# Patient Record
Sex: MALE | Race: WHITE | NOT HISPANIC OR LATINO | Employment: OTHER | URBAN - METROPOLITAN AREA
[De-identification: names, ages, dates, MRNs, and addresses within clinical notes are randomized per-mention and may not be internally consistent; named-entity substitution may affect disease eponyms.]

---

## 2017-02-06 ENCOUNTER — OFFICE VISIT (OUTPATIENT)
Dept: OTOLARYNGOLOGY | Facility: CLINIC | Age: 62
End: 2017-02-06
Payer: COMMERCIAL

## 2017-02-06 VITALS
WEIGHT: 215.63 LBS | DIASTOLIC BLOOD PRESSURE: 90 MMHG | HEART RATE: 66 BPM | TEMPERATURE: 96 F | SYSTOLIC BLOOD PRESSURE: 141 MMHG

## 2017-02-06 DIAGNOSIS — K13.79 ACQUIRED DYSPLASIA OF ORAL CAVITY: Primary | ICD-10-CM

## 2017-02-06 PROCEDURE — 99213 OFFICE O/P EST LOW 20 MIN: CPT | Mod: S$GLB,,, | Performed by: OTOLARYNGOLOGY

## 2017-02-06 PROCEDURE — 99999 PR PBB SHADOW E&M-EST. PATIENT-LVL III: CPT | Mod: PBBFAC,,, | Performed by: OTOLARYNGOLOGY

## 2017-02-06 RX ORDER — LIDOCAINE HYDROCHLORIDE 10 MG/ML
1 INJECTION, SOLUTION EPIDURAL; INFILTRATION; INTRACAUDAL; PERINEURAL ONCE
Status: CANCELLED | OUTPATIENT
Start: 2017-02-06 | End: 2017-02-06

## 2017-02-06 NOTE — PROGRESS NOTES
Chief Complaint   Patient presents with    Follow-up       HPI   61 y.o. male presents for evaluation of dysplasia of the tongue.  He states that he was diagnosed with mild dysplasia of the oral tongue in 2014.  He was referred to [facility] in [place], [place] where he underwent multiple CO2 laser ablations of these oral lesions.  He presents today in follow up.  He reports some discomfort of the L tongue and buccal mucosa lesions and wishes to undergo resection.    Review of Systems   Constitutional: Negative for fatigue and unexpected weight change.   HENT: Per HPI.  Eyes: Negative for visual disturbance.   Respiratory: Negative for shortness of breath, hemoptysis   Cardiovascular: Negative for chest pain and palpitations.   Musculoskeletal: Negative for decreased ROM, back pain.   Skin: Negative for rash, sunburn, itching.   Neurological: Negative for dizziness and seizures.   Hematological: Negative for adenopathy. Does not bruise/bleed easily.   Endocrine: Negative for rapid weight loss/weight gain, heat/cold intolerance.     Past Medical History   Past Medical History   Diagnosis Date    Cancer          Past Surgical History   History reviewed. No pertinent past surgical history.      Family History   History reviewed. No pertinent family history.        Social History   .  Social History     Social History    Marital status:      Spouse name: N/A    Number of children: N/A    Years of education: N/A     Occupational History    Not on file.     Social History Main Topics    Smoking status: Never Smoker    Smokeless tobacco: Not on file    Alcohol use Not on file    Drug use: Not on file    Sexual activity: Not on file     Other Topics Concern    Not on file     Social History Narrative         Allergies   Review of patient's allergies indicates:   Allergen Reactions    Penicillins Other (See Comments)     From early childhood           Physical Exam     Vitals:    02/06/17 1320   BP: (!) 141/90    Pulse: 66   Temp: 96.1 °F (35.6 °C)         There is no height or weight on file to calculate BMI.      General: AOx3, NAD   Right Ear: External Auditory Canal WNL,TM w/o masses/lesions/perforations.  Middle ear without evidence of effusion.   Left Ear: External Auditory Canal WNL,TM w/o masses/lesions/perforations.  Middle ear without evidence of effusion.   Nose: No gross nasal septal deviation. Inferior Turbinates WNL bilaterally. No septal perforation. No masses/lesions.   Oral Cavity:  Oral Tongue mobile.  Bilateral lateral tongue and buccal mucosa with faint leukoplakia/scar L>R.   Hard Palate WNL.  Oropharynx:  No masses/lesions of the posterior pharyngeal wall. Tonsillar fossa without lesions. Soft palate without masses. Midline uvula.   Neck: No scars.  No cervical lymphadenopathy, thyromegaly or thyroid nodules.    Face: House Brackmann I bilaterally.       Assessment   1. Acquired dysplasia of oral cavity        Plan   61 y.o. male with mild dysplasia of the oral cavity status post CO2 laser ablation.  He wishes to undergo resection of the L tongue and buccal mucosa lesions.    We discussed the risks, benefits, indications, and alternatives to composite resection of the  oral soft tissues. The risks were described to include, but not be limited to, infection, bleeding, scarring, temporary or permanent impairment of speech and swallowing, delayed healing, malocclusion, poor control of saliva with drooling, pooling of secretions, trismus, aspiration, pain, loss of tongue mobility, recurrence, and the need for additional procedures. Time was allowed for questions, and all questions were answered to the patient's satisfaction.    Surgery is scheduled on 3/1/17.

## 2017-02-17 RX ORDER — ATORVASTATIN CALCIUM 20 MG/1
20 TABLET, FILM COATED ORAL DAILY
COMMUNITY

## 2017-02-17 NOTE — PRE ADMISSION SCREENING
Anesthesia Assessment: Preoperative EQUATION    Planned Procedure: Procedure(s) (LRB):  RESECTION-TUMOR-ORAL (Left)  Requested Anesthesia Type:General  Surgeon: Jose C Varela MD  Service: ENT  Known or anticipated Date of Surgery:3/1/2017    Surgeon notes: reviewed    Electronic QUestionnaire Assessment completed via nurse interview with patient.        Alfredo Treviño [[id number]] - 61 y.o. Male        Providers Outside of [facility]           Pre-admit from 3/1/2017 in [facility]     Has outside PCP  Yes [Dr. Chandu Dale [place]]       Surgical Risk Level      Surgical Risk Level:  2           caRDScore (Clinical Anesthesia Rapid Decision Score)        Very Very Low  Total Score: 0        caRDScores (Grouped)      caRDScore - Ane:  0                caRDScore - CVD:  0                caRDScore - Pul:  0                caRDScore - Met:  0                caRDScore - Phy:  0           caRDScore Items           Pre-admit from 3/1/2017 in [facility]     Anesthesia      CVD      Activity similar to best ability for maximal activity or exercise  METS 4     Pulmonary      Metabolic      Physiologic        Flags      Red Flag Score:  0                Yellow Flag Score:  1           Red Flags      No data to display       Yellow Flags           Pre-admit from 3/1/2017 in [facility]     Has pain  Yes       PONV Risk Score (assumes periop narcotic use = +1, Max=4)      PONV Risk Score:  2           PONV Risk Factors  Total Score: 1      1 Non-Smoker at present       Sleep Apnea  Total Score: 0        NEGIN STOP-Bang Risk Factors (Max=8)  Total Score: 3      1 Has loud snoring     1 Age >50     1 Male       NEGIN Risk Level - 1 (Low), 2 (Moderate), 3 (High)      NEGIN Risk Level:  2           RCRI (Revised Cardiac Risk Indices of ACC/AHA guidelines, Max=6)  Total Score: 0        CAD Risk Factors  Total Score: 1      1 Male. Age >45       CHADS Score if  applicable (history of atrial fib/flutter, Max=6)  Total Score: 0        Maximal Exercise Capacity           Pre-admit from 3/1/2017 in [facility]     Maximal Exercise Capacity  METS 4       Summary of Dependence  Total Score: 1      1 Is totally independent of others for activities of daily living       Phone Fraility Score (Max = 17)  Total Score: 0        Pain Factors           Pre-admit from 3/1/2017 in [facility]     Has pain  Yes     Location and description of pain  -- [oral cavity // minor discomfort]       Risk Triggers (Evidence-Based Risk Triggers)        Pulmonary Risk Triggers  Total Score: 0        Renal Risk Triggers  Total Score: 0        Delirium Risk Triggers  Total Score: 0        Urologic Risk Triggers  Total Score: 0        Logistics        Pre-op Clinic Logistics  Total Score: 2      1 Has outside PCP     1 Has had anesthesia, either as adult or as a child       DOSC Logistics  Total Score: 1      1 Has peripheral neuropathy       Discharge Logistics  Total Score: 1      1 Has peripheral neuropathy       Discharge Planning           Pre-admit from 3/1/2017 in [facility]     Discharge Planning      Discharge plans  Home with assistance     Will assist patient 24/7, if needed  -- [wife corwin]       Fast Track <For office use only>      Total Score: 2        Surgical Risk Level Assessment                 Triage considerations:     The patient has no apparent active cardiac condition (No unstable coronary Syndrome such as severe unstable angina or recent [<1 month] myocardial infarction, decompensated CHF, severe valvular   disease or significant arrhythmia)    Previous anesthesia records:Not available    Last PCP note: within 1 month , outside [facility] Dr. Dale      Other important co-morbidities: HLD/ arthritis     Tests already available:  none here // requesting from out of state            Instructions given. (See in Nurse's  note)    Optimization:   Medical Opinion Indicated          Plan:     Consultation:requesting recent notes from pcp       Navigation: 61 yr old male// cards score 0//alexander 2// states active// denies any heart history or respiratory issues.  Phone  INNA Sutton RN BC 2/17/17

## 2017-02-28 ENCOUNTER — ANESTHESIA EVENT (OUTPATIENT)
Dept: SURGERY | Facility: HOSPITAL | Age: 62
End: 2017-02-28
Payer: COMMERCIAL

## 2017-03-01 ENCOUNTER — SURGERY (OUTPATIENT)
Age: 62
End: 2017-03-01

## 2017-03-01 ENCOUNTER — HOSPITAL ENCOUNTER (OUTPATIENT)
Facility: HOSPITAL | Age: 62
Discharge: HOME | End: 2017-03-02
Attending: OTOLARYNGOLOGY | Admitting: OTOLARYNGOLOGY
Payer: COMMERCIAL

## 2017-03-01 ENCOUNTER — ANESTHESIA (OUTPATIENT)
Dept: SURGERY | Facility: HOSPITAL | Age: 62
End: 2017-03-01
Payer: COMMERCIAL

## 2017-03-01 DIAGNOSIS — K13.79 ACQUIRED DYSPLASIA OF ORAL CAVITY: Primary | ICD-10-CM

## 2017-03-01 PROCEDURE — 25000003 PHARM REV CODE 250: Performed by: NURSE ANESTHETIST, CERTIFIED REGISTERED

## 2017-03-01 PROCEDURE — 88305 TISSUE EXAM BY PATHOLOGIST: CPT | Mod: 26,,,

## 2017-03-01 PROCEDURE — 25000003 PHARM REV CODE 250: Performed by: OTOLARYNGOLOGY

## 2017-03-01 PROCEDURE — 63600175 PHARM REV CODE 636 W HCPCS: Performed by: ANESTHESIOLOGY

## 2017-03-01 PROCEDURE — 71000033 HC RECOVERY, INTIAL HOUR: Performed by: OTOLARYNGOLOGY

## 2017-03-01 PROCEDURE — 25000003 PHARM REV CODE 250: Performed by: ANESTHESIOLOGY

## 2017-03-01 PROCEDURE — 27200708 HC INTUBATION/EXCHANGE WAND: Performed by: NURSE ANESTHETIST, CERTIFIED REGISTERED

## 2017-03-01 PROCEDURE — 40812 EXCISE/REPAIR MOUTH LESION: CPT | Mod: XS,,, | Performed by: OTOLARYNGOLOGY

## 2017-03-01 PROCEDURE — 36000707: Performed by: OTOLARYNGOLOGY

## 2017-03-01 PROCEDURE — 41120 PARTIAL REMOVAL OF TONGUE: CPT | Mod: ,,, | Performed by: OTOLARYNGOLOGY

## 2017-03-01 PROCEDURE — 36000706: Performed by: OTOLARYNGOLOGY

## 2017-03-01 PROCEDURE — 37000008 HC ANESTHESIA 1ST 15 MINUTES: Performed by: OTOLARYNGOLOGY

## 2017-03-01 PROCEDURE — 88305 TISSUE EXAM BY PATHOLOGIST: CPT

## 2017-03-01 PROCEDURE — 37000009 HC ANESTHESIA EA ADD 15 MINS: Performed by: OTOLARYNGOLOGY

## 2017-03-01 PROCEDURE — 27000221 HC OXYGEN, UP TO 24 HOURS

## 2017-03-01 PROCEDURE — 25000003 PHARM REV CODE 250

## 2017-03-01 PROCEDURE — 88307 TISSUE EXAM BY PATHOLOGIST: CPT | Mod: 26,,,

## 2017-03-01 PROCEDURE — 88307 TISSUE EXAM BY PATHOLOGIST: CPT

## 2017-03-01 PROCEDURE — 63600175 PHARM REV CODE 636 W HCPCS: Performed by: NURSE ANESTHETIST, CERTIFIED REGISTERED

## 2017-03-01 PROCEDURE — D9220A PRA ANESTHESIA: Mod: CRNA,,, | Performed by: NURSE ANESTHETIST, CERTIFIED REGISTERED

## 2017-03-01 PROCEDURE — 71000039 HC RECOVERY, EACH ADD'L HOUR: Performed by: OTOLARYNGOLOGY

## 2017-03-01 PROCEDURE — D9220A PRA ANESTHESIA: Mod: ANES,,, | Performed by: ANESTHESIOLOGY

## 2017-03-01 RX ORDER — ONDANSETRON 8 MG/1
8 TABLET, ORALLY DISINTEGRATING ORAL EVERY 8 HOURS PRN
Status: DISCONTINUED | OUTPATIENT
Start: 2017-03-01 | End: 2017-03-02 | Stop reason: HOSPADM

## 2017-03-01 RX ORDER — ROCURONIUM BROMIDE 10 MG/ML
INJECTION, SOLUTION INTRAVENOUS
Status: DISCONTINUED | OUTPATIENT
Start: 2017-03-01 | End: 2017-03-01

## 2017-03-01 RX ORDER — MIDAZOLAM HYDROCHLORIDE 1 MG/ML
INJECTION, SOLUTION INTRAMUSCULAR; INTRAVENOUS
Status: DISCONTINUED | OUTPATIENT
Start: 2017-03-01 | End: 2017-03-01

## 2017-03-01 RX ORDER — HYDROCODONE BITARTRATE AND ACETAMINOPHEN 5; 325 MG/1; MG/1
1 TABLET ORAL EVERY 4 HOURS PRN
Status: DISCONTINUED | OUTPATIENT
Start: 2017-03-01 | End: 2017-03-02 | Stop reason: HOSPADM

## 2017-03-01 RX ORDER — SODIUM CHLORIDE 9 MG/ML
INJECTION, SOLUTION INTRAVENOUS CONTINUOUS
Status: DISCONTINUED | OUTPATIENT
Start: 2017-03-01 | End: 2017-03-01

## 2017-03-01 RX ORDER — SODIUM CHLORIDE 0.9 % (FLUSH) 0.9 %
3 SYRINGE (ML) INJECTION
Status: DISCONTINUED | OUTPATIENT
Start: 2017-03-01 | End: 2017-03-01

## 2017-03-01 RX ORDER — CELECOXIB 200 MG/1
200 CAPSULE ORAL 2 TIMES DAILY
Status: DISCONTINUED | OUTPATIENT
Start: 2017-03-01 | End: 2017-03-02 | Stop reason: HOSPADM

## 2017-03-01 RX ORDER — LIDOCAINE HCL/PF 100 MG/5ML
SYRINGE (ML) INTRAVENOUS
Status: DISCONTINUED | OUTPATIENT
Start: 2017-03-01 | End: 2017-03-01

## 2017-03-01 RX ORDER — ATORVASTATIN CALCIUM 20 MG/1
20 TABLET, FILM COATED ORAL DAILY
Status: DISCONTINUED | OUTPATIENT
Start: 2017-03-02 | End: 2017-03-02 | Stop reason: HOSPADM

## 2017-03-01 RX ORDER — CLINDAMYCIN PHOSPHATE 900 MG/50ML
900 INJECTION, SOLUTION INTRAVENOUS
Status: COMPLETED | OUTPATIENT
Start: 2017-03-01 | End: 2017-03-01

## 2017-03-01 RX ORDER — FENTANYL CITRATE 50 UG/ML
INJECTION, SOLUTION INTRAMUSCULAR; INTRAVENOUS
Status: DISCONTINUED | OUTPATIENT
Start: 2017-03-01 | End: 2017-03-01

## 2017-03-01 RX ORDER — LIDOCAINE HYDROCHLORIDE 10 MG/ML
1 INJECTION, SOLUTION EPIDURAL; INFILTRATION; INTRACAUDAL; PERINEURAL ONCE
Status: COMPLETED | OUTPATIENT
Start: 2017-03-01 | End: 2017-03-01

## 2017-03-01 RX ORDER — ACETAMINOPHEN 325 MG/1
650 TABLET ORAL EVERY 6 HOURS PRN
Status: DISCONTINUED | OUTPATIENT
Start: 2017-03-01 | End: 2017-03-02 | Stop reason: HOSPADM

## 2017-03-01 RX ORDER — MORPHINE SULFATE 2 MG/ML
2 INJECTION, SOLUTION INTRAMUSCULAR; INTRAVENOUS EVERY 4 HOURS PRN
Status: DISCONTINUED | OUTPATIENT
Start: 2017-03-01 | End: 2017-03-02 | Stop reason: HOSPADM

## 2017-03-01 RX ORDER — PROPOFOL 10 MG/ML
VIAL (ML) INTRAVENOUS
Status: DISCONTINUED | OUTPATIENT
Start: 2017-03-01 | End: 2017-03-01

## 2017-03-01 RX ORDER — HYDROMORPHONE HYDROCHLORIDE 1 MG/ML
0.2 INJECTION, SOLUTION INTRAMUSCULAR; INTRAVENOUS; SUBCUTANEOUS EVERY 5 MIN PRN
Status: DISCONTINUED | OUTPATIENT
Start: 2017-03-01 | End: 2017-03-01 | Stop reason: HOSPADM

## 2017-03-01 RX ORDER — SUCCINYLCHOLINE CHLORIDE 20 MG/ML
INJECTION INTRAMUSCULAR; INTRAVENOUS
Status: DISCONTINUED | OUTPATIENT
Start: 2017-03-01 | End: 2017-03-01

## 2017-03-01 RX ORDER — CLINDAMYCIN PHOSPHATE 900 MG/50ML
900 INJECTION, SOLUTION INTRAVENOUS
Status: COMPLETED | OUTPATIENT
Start: 2017-03-01 | End: 2017-03-02

## 2017-03-01 RX ORDER — ONDANSETRON 2 MG/ML
INJECTION INTRAMUSCULAR; INTRAVENOUS
Status: DISCONTINUED | OUTPATIENT
Start: 2017-03-01 | End: 2017-03-01

## 2017-03-01 RX ORDER — DEXAMETHASONE SODIUM PHOSPHATE 4 MG/ML
INJECTION, SOLUTION INTRA-ARTICULAR; INTRALESIONAL; INTRAMUSCULAR; INTRAVENOUS; SOFT TISSUE
Status: DISCONTINUED | OUTPATIENT
Start: 2017-03-01 | End: 2017-03-01

## 2017-03-01 RX ADMIN — HYDROMORPHONE HYDROCHLORIDE: 1 INJECTION, SOLUTION INTRAMUSCULAR; INTRAVENOUS; SUBCUTANEOUS at 04:03

## 2017-03-01 RX ADMIN — DEXAMETHASONE SODIUM PHOSPHATE 12 MG: 4 INJECTION, SOLUTION INTRAMUSCULAR; INTRAVENOUS at 02:03

## 2017-03-01 RX ADMIN — ONDANSETRON 4 MG: 2 INJECTION INTRAMUSCULAR; INTRAVENOUS at 03:03

## 2017-03-01 RX ADMIN — CLINDAMYCIN IN 5 PERCENT DEXTROSE 900 MG: 18 INJECTION, SOLUTION INTRAVENOUS at 11:03

## 2017-03-01 RX ADMIN — ROCURONIUM BROMIDE 5 MG: 10 INJECTION, SOLUTION INTRAVENOUS at 02:03

## 2017-03-01 RX ADMIN — SUCCINYLCHOLINE CHLORIDE 200 MG: 20 INJECTION, SOLUTION INTRAMUSCULAR; INTRAVENOUS at 02:03

## 2017-03-01 RX ADMIN — HYDROMORPHONE HYDROCHLORIDE 0.2 MG: 1 INJECTION, SOLUTION INTRAMUSCULAR; INTRAVENOUS; SUBCUTANEOUS at 04:03

## 2017-03-01 RX ADMIN — CLINDAMYCIN PHOSPHATE 900 MG: 18 INJECTION, SOLUTION INTRAVENOUS at 02:03

## 2017-03-01 RX ADMIN — FENTANYL CITRATE 100 MCG: 50 INJECTION, SOLUTION INTRAMUSCULAR; INTRAVENOUS at 02:03

## 2017-03-01 RX ADMIN — MIDAZOLAM HYDROCHLORIDE 2 MG: 1 INJECTION, SOLUTION INTRAMUSCULAR; INTRAVENOUS at 02:03

## 2017-03-01 RX ADMIN — PROPOFOL 100 MG: 10 INJECTION, EMULSION INTRAVENOUS at 02:03

## 2017-03-01 RX ADMIN — SODIUM CHLORIDE: 0.9 INJECTION, SOLUTION INTRAVENOUS at 10:03

## 2017-03-01 RX ADMIN — LIDOCAINE HYDROCHLORIDE 0.2 MG: 10 INJECTION, SOLUTION EPIDURAL; INFILTRATION; INTRACAUDAL; PERINEURAL at 10:03

## 2017-03-01 RX ADMIN — LIDOCAINE HYDROCHLORIDE 100 MG: 20 INJECTION, SOLUTION INTRAVENOUS at 02:03

## 2017-03-01 RX ADMIN — PROPOFOL 180 MG: 10 INJECTION, EMULSION INTRAVENOUS at 02:03

## 2017-03-01 RX ADMIN — SODIUM CHLORIDE, SODIUM GLUCONATE, SODIUM ACETATE, POTASSIUM CHLORIDE, MAGNESIUM CHLORIDE, SODIUM PHOSPHATE, DIBASIC, AND POTASSIUM PHOSPHATE: .53; .5; .37; .037; .03; .012; .00082 INJECTION, SOLUTION INTRAVENOUS at 03:03

## 2017-03-01 RX ADMIN — HYDROCODONE BITARTRATE AND ACETAMINOPHEN 1 TABLET: 5; 325 TABLET ORAL at 09:03

## 2017-03-01 NOTE — ANESTHESIA RELEASE NOTE
Anesthesia Release from PACU Note    Patient: Alfredo Treviño    Procedure(s) Performed: Procedure(s) (LRB):  RESECTION-TUMOR-ORAL (Left)    Anesthesia type: GA    Post pain: Adequate analgesia    Post assessment: no apparent anesthetic complications    Last Vitals:   Vitals:    03/01/17 1715   BP: 115/71   Pulse: 63   Resp: 13   Temp:    SpO2: 97%       Post vital signs: stable    Level of consciousness: awake    Complications: none    Airway Patency: patent    Respiratory: spontaneous    Cardiovascular: stable    Hydration: euvolemic

## 2017-03-01 NOTE — ANESTHESIA PREPROCEDURE EVALUATION
03/01/2017  Alfredo Treviño is a 61 y.o., male.  Pre-operative evaluation for Procedure(s) (LRB):  RESECTION-TUMOR-ORAL (Left)    Alfredo Treviño is a 61 y.o. male     Patient Active Problem List   Diagnosis    Acquired dysplasia of oral cavity       Review of patient's allergies indicates:   Allergen Reactions    Penicillins Other (See Comments)     From early childhood       No current facility-administered medications on file prior to encounter.      Current Outpatient Prescriptions on File Prior to Encounter   Medication Sig Dispense Refill    celecoxib (CELEBREX) 200 MG capsule Take 200 mg by mouth 2 (two) times daily.      GLUC HCL/MSM/C/MN/WILLOW/GING (GLUCOS-MSM-C-YR-QOXYPL-LYGNOC ORAL) Take by mouth.         History reviewed. No pertinent surgical history.    Social History     Social History    Marital status:      Spouse name: N/A    Number of children: N/A    Years of education: N/A     Occupational History    Not on file.     Social History Main Topics    Smoking status: Never Smoker    Smokeless tobacco: Not on file    Alcohol use Yes    Drug use: No    Sexual activity: Not on file     Other Topics Concern    Not on file     Social History Narrative         Vital Signs Range (Last 24H):  Temp:  [36.5 °C (97.7 °F)]   Pulse:  [57]   Resp:  [20]   BP: (131)/(72)   SpO2:  [98 %]           [facility] Anesthesia Evaluation    I have reviewed the Patient Summary Reports.     I have reviewed the Medications.     Review of Systems  Anesthesia Hx:  No problems with previous Anesthesia  History of prior surgery of interest to airway management or planning:  Denies Personal Hx of Anesthesia complications.   Social:  Former Smoker, Non-Smoker    Cardiovascular:  Cardiovascular Normal     Pulmonary:  Pulmonary Normal    Hepatic/GI:  Hepatic/GI Normal    Neurological:  Neurology Normal     Endocrine:  Endocrine Normal        Physical Exam  General:  Well nourished    Airway/Jaw/Neck:  Airway Findings: Mouth Opening: Normal Tongue: Normal  General Airway Assessment: Adult  Mallampati: III  Improves to II with phonation.  TM Distance: Normal, at least 6 cm      Dental:  Dental Findings: In tact        Mental Status:  Mental Status Findings:  Cooperative, Alert and Oriented         Anesthesia Plan  Type of Anesthesia, risks & benefits discussed:  Anesthesia Type:  general  Patient's Preference:   Intra-op Monitoring Plan: standard ASA monitors  Intra-op Monitoring Plan Comments:   Post Op Pain Control Plan:   Post Op Pain Control Plan Comments:   Induction:   IV  Beta Blocker:  Patient is not currently on a Beta-Blocker (No further documentation required).       Informed Consent: Patient understands risks and agrees with Anesthesia plan.  Questions answered. Anesthesia consent signed with patient.  ASA Score: 2     Day of Surgery Review of History & Physical:    H&P update referred to the surgeon.         Ready For Surgery From Anesthesia Perspective.

## 2017-03-01 NOTE — INTERVAL H&P NOTE
The patient has been examined and the H&P has been reviewed:    I concur with the findings and changes have been noted since the H&P was written: Plan to proceed with surgery as scheduled.     Anesthesia/Surgery risks, benefits and alternative options discussed and understood by patient/family.          Active Hospital Problems    Diagnosis  POA    Acquired dysplasia of oral cavity [K13.79]  Yes      Resolved Hospital Problems    Diagnosis Date Resolved POA   No resolved problems to display.

## 2017-03-01 NOTE — ANESTHESIA POSTPROCEDURE EVALUATION
"Anesthesia Post Evaluation    Patient: Alfredo Treviño    Procedure(s) Performed: Procedure(s) (LRB):  RESECTION-TUMOR-ORAL (Left)    Final Anesthesia Type: general  Patient location during evaluation: PACU  Patient participation: No - Unable to Participate, Coma/Other Inability to Communicate  Level of consciousness: awake and alert  Post-procedure vital signs: reviewed and stable  Pain management: adequate  Airway patency: patent  PONV status at discharge: No PONV  Anesthetic complications: no      Cardiovascular status: blood pressure returned to baseline  Respiratory status: spontaneous ventilation and room air  Hydration status: euvolemic  Follow-up not needed.        Visit Vitals    /71    Pulse 63    Temp 36.3 °C (97.3 °F) (Skin)    Resp 13    Ht 5' 11" (1.803 m)    Wt 92.5 kg (204 lb)    SpO2 97%    BMI 28.45 kg/m2       Pain/Juanita Score: Pain Assessment Performed: Yes (3/1/2017  4:50 PM)  Presence of Pain: complains of pain/discomfort (3/1/2017  4:50 PM)  Pain Rating Prior to Med Admin: 6 (3/1/2017  4:20 PM)  Pain Rating Post Med Admin: 3 (3/1/2017  4:50 PM)  Juanita Score: 8 (3/1/2017  4:50 PM)      "

## 2017-03-01 NOTE — H&P (VIEW-ONLY)
Chief Complaint   Patient presents with    Follow-up       HPI   61 y.o. male presents for evaluation of dysplasia of the tongue.  He states that he was diagnosed with mild dysplasia of the oral tongue in 2014.  He was referred to [facility] in [place], [place] where he underwent multiple CO2 laser ablations of these oral lesions.  He presents today in follow up.  He reports some discomfort of the L tongue and buccal mucosa lesions and wishes to undergo resection.    Review of Systems   Constitutional: Negative for fatigue and unexpected weight change.   HENT: Per HPI.  Eyes: Negative for visual disturbance.   Respiratory: Negative for shortness of breath, hemoptysis   Cardiovascular: Negative for chest pain and palpitations.   Musculoskeletal: Negative for decreased ROM, back pain.   Skin: Negative for rash, sunburn, itching.   Neurological: Negative for dizziness and seizures.   Hematological: Negative for adenopathy. Does not bruise/bleed easily.   Endocrine: Negative for rapid weight loss/weight gain, heat/cold intolerance.     Past Medical History   Past Medical History   Diagnosis Date    Cancer          Past Surgical History   History reviewed. No pertinent past surgical history.      Family History   History reviewed. No pertinent family history.        Social History   .  Social History     Social History    Marital status:      Spouse name: N/A    Number of children: N/A    Years of education: N/A     Occupational History    Not on file.     Social History Main Topics    Smoking status: Never Smoker    Smokeless tobacco: Not on file    Alcohol use Not on file    Drug use: Not on file    Sexual activity: Not on file     Other Topics Concern    Not on file     Social History Narrative         Allergies   Review of patient's allergies indicates:   Allergen Reactions    Penicillins Other (See Comments)     From early childhood           Physical Exam     Vitals:    02/06/17 1320   BP: (!) 141/90    Pulse: 66   Temp: 96.1 °F (35.6 °C)         There is no height or weight on file to calculate BMI.      General: AOx3, NAD   Right Ear: External Auditory Canal WNL,TM w/o masses/lesions/perforations.  Middle ear without evidence of effusion.   Left Ear: External Auditory Canal WNL,TM w/o masses/lesions/perforations.  Middle ear without evidence of effusion.   Nose: No gross nasal septal deviation. Inferior Turbinates WNL bilaterally. No septal perforation. No masses/lesions.   Oral Cavity:  Oral Tongue mobile.  Bilateral lateral tongue and buccal mucosa with faint leukoplakia/scar L>R.   Hard Palate WNL.  Oropharynx:  No masses/lesions of the posterior pharyngeal wall. Tonsillar fossa without lesions. Soft palate without masses. Midline uvula.   Neck: No scars.  No cervical lymphadenopathy, thyromegaly or thyroid nodules.    Face: House Brackmann I bilaterally.       Assessment   1. Acquired dysplasia of oral cavity        Plan   61 y.o. male with mild dysplasia of the oral cavity status post CO2 laser ablation.  He wishes to undergo resection of the L tongue and buccal mucosa lesions.    We discussed the risks, benefits, indications, and alternatives to composite resection of the  oral soft tissues. The risks were described to include, but not be limited to, infection, bleeding, scarring, temporary or permanent impairment of speech and swallowing, delayed healing, malocclusion, poor control of saliva with drooling, pooling of secretions, trismus, aspiration, pain, loss of tongue mobility, recurrence, and the need for additional procedures. Time was allowed for questions, and all questions were answered to the patient's satisfaction.    Surgery is scheduled on 3/1/17.

## 2017-03-01 NOTE — OR NURSING
Nurse Handoff given to Keli PACU-RN  Verbalized procedure, allergy.  No dressings/drains present.

## 2017-03-01 NOTE — IP AVS SNAPSHOT
[facility]  [address]  [facility] [address]  Phone: [phone]           Patient Discharge Instructions     Our goal is to set you up for success. This packet includes information on your condition, medications, and your home care. It will help you to care for yourself so you don't get sicker and need to go back to the hospital.     Please ask your nurse if you have any questions.        There are many details to remember when preparing to leave the hospital. Here is what you will need to do:    1. Take your medicine. If you are prescribed medications, review your Medication List in the following pages. You may have new medications to  at the pharmacy and others that you'll need to stop taking. Review the instructions for how and when to take your medications. Talk with your doctor or nurses if you are unsure of what to do.     2. Go to your follow-up appointments. Specific follow-up information is listed in the following pages. Your may be contacted by a transition nurse or clinical provider about future appointments. Be sure we have all of the phone numbers to reach you, if needed. Please contact your provider's office if you are unable to make an appointment.     3. Watch for warning signs. Your doctor or nurse will give you detailed warning signs to watch for and when to call for assistance. These instructions may also include educational information about your condition. If you experience any of warning signs to your health, call your doctor.               [facility]  Unless otherwise directed by your provider, please contact [facility], our nurse care line that is available for 24/7 assistance.     [phone] (toll-free)    Registered nurses in the [facility] provide clinical advisement, health education, appointment booking, and other advisory services.                    ** Verify the list of medication(s) below is accurate and up  to date. Carry this with you in case of emergency. If your medications have changed, please notify your healthcare provider.             Medication List      START taking these medications        Additional Info                      chlorhexidine 0.12 % solution   Commonly known as:  PERIDEX   Quantity:  420 mL   Refills:  0   Dose:  15 mL    Instructions:  Use as directed 15 mLs in the mouth or throat 2 (two) times daily.     Begin Date    AM    Noon    PM    Bedtime       hydrocodone-apap 2.5-108 MG/5 ML oral solution   Commonly known as:  HYCET   Quantity:  473 mL   Refills:  0   Dose:  15 mL    Instructions:  Take 15 mLs by mouth every 6 (six) hours as needed for Pain.     Begin Date    AM    Noon    PM    Bedtime       ondansetron 4 MG Tbdl   Commonly known as:  ZOFRAN-ODT   Quantity:  5 tablet   Refills:  5   Dose:  4 mg    Instructions:  Take 1 tablet (4 mg total) by mouth every 6 (six) hours as needed (nausea).     Begin Date    AM    Noon    PM    Bedtime         CONTINUE taking these medications        Additional Info                      atorvastatin 20 MG tablet   Commonly known as:  LIPITOR   Refills:  0   Dose:  20 mg    Instructions:  Take 20 mg by mouth once daily.     Begin Date    AM    Noon    PM    Bedtime       celecoxib 200 MG capsule   Commonly known as:  CeleBREX   Refills:  0   Dose:  200 mg    Instructions:  Take 200 mg by mouth 2 (two) times daily.     Begin Date    AM    Noon    PM    Bedtime       GLUCOS-MSM-C-JD-NRWOEW-RSUQXT ORAL   Refills:  0    Instructions:  Take by mouth.     Begin Date    AM    Noon    PM    Bedtime            Where to Get Your Medications      You can get these medications from any pharmacy     Bring a paper prescription for each of these medications     chlorhexidine 0.12 % solution    hydrocodone-apap 2.5-108 MG/5 ML oral solution    ondansetron 4 MG Tbdl                  Please bring to all follow up appointments:    1. A copy of your discharge  instructions.  2. All medicines you are currently taking in their original bottles.  3. Identification and insurance card.    Please arrive 15 minutes ahead of scheduled appointment time.    Please call 24 hours in advance if you must reschedule your appointment and/or time.        Follow-up Information     Follow up with Shana Orozco NP In 2 weeks.    Specialty:  Otolaryngology    Why:  post op    Contact information:    [address]  [facility] [address]  [phone]          Discharge Instructions     Future Orders    Activity as tolerated     Call MD for:  difficulty breathing or increased cough     Call MD for:  persistent nausea and vomiting or diarrhea     Call MD for:  redness, tenderness, or signs of infection (pain, swelling, redness, odor or green/yellow discharge around incision site)     Call MD for:  severe uncontrolled pain     Call MD for:  temperature >100.4     Diet general     Questions:    Total calories:      Fat restriction, if any:      Protein restriction, if any:      Na restriction, if any:      Fluid restriction:      Additional restrictions:          Primary Diagnosis     Your primary diagnosis was:  Disease Of Mouth      Admission Information     Date & Time Provider Department [facility]    3/1/2017  9:35 AM Jose C Varela MD [facility] [id number]      Care Providers     Provider Role Specialty Primary office phone    Jose C Varela MD Attending Provider Otolaryngology [phone]    Jose C Varela MD Surgeon  Otolaryngology [phone]      Your Vitals Were     BP                   137/79 (BP Location: Right arm, Patient Position: Lying, BP Method: Automatic)           Recent Lab Values     No lab values to display.      Pending Labs     Order Current Status    Specimen to Pathology - Surgery In process      Allergies as of 3/2/2017        Reactions    Penicillins Other (See Comments)    From early childhood      Advance Directives     An  advance directive is a document which, in the event you are no longer able to make decisions for yourself, tells your healthcare team what kind of treatment you do or do not want to receive, or who you would like to make those decisions for you.  If you do not currently have an advance directive, [facility] encourages you to create one.  For more information call:  [phone]), [phone] ([phone]),  or log on to [email or web address].        Language Assistance Services     ATTENTION: Language assistance services are available, free of charge. Please call [phone].      ATENCIÓN: Si tiffanyla matt, tiene a carvalho disposición servicios gratuitos de asistencia lingüística. Llame al [phone].     CHÚ Ý: N?u b?n nói Ti?ng Vi?t, có các d?ch v? h? tr? ngôn ng? mi?n phí dành cho b?n. G?i s? [phone].        [facility] Sign-Up     Activating your [facility] account is as easy as 1-2-3!     1) Visit [email or web address], select Sign Up Now, enter this activation code and your date of birth, then select Next.  [email or web address]  Expires: 4/16/2017  7:14 AM      2) Create a username and password to use when you visit [facility] in the future and select a security question in case you lose your password and select Next.    3) Enter your e-mail address and click Sign Up!    Additional Information  If you have questions, please e-mail [email or web address] or call [phone] to talk to our [facility] staff. Remember, [facility] is NOT to be used for urgent needs. For medical emergencies, dial 911.          [facility] complies with applicable Federal civil rights laws and does not discriminate on the basis of race, color, national origin, age, disability, or sex.

## 2017-03-01 NOTE — BRIEF OP NOTE
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 Single Stitch Anterior, Double Stitch Superior - Perm  2) Left Buccal Mucosa. Single Stitch Anterior, Double Stitch Superior - Perm    03/01/17 1516

## 2017-03-02 VITALS
WEIGHT: 204 LBS | DIASTOLIC BLOOD PRESSURE: 72 MMHG | BODY MASS INDEX: 28.56 KG/M2 | HEIGHT: 71 IN | OXYGEN SATURATION: 97 % | RESPIRATION RATE: 16 BRPM | HEART RATE: 71 BPM | SYSTOLIC BLOOD PRESSURE: 135 MMHG | TEMPERATURE: 98 F

## 2017-03-02 PROCEDURE — 63600175 PHARM REV CODE 636 W HCPCS

## 2017-03-02 PROCEDURE — 25000003 PHARM REV CODE 250

## 2017-03-02 RX ORDER — BISACODYL 5 MG
5 TABLET, DELAYED RELEASE (ENTERIC COATED) ORAL DAILY PRN
Status: DISCONTINUED | OUTPATIENT
Start: 2017-03-02 | End: 2017-03-02 | Stop reason: HOSPADM

## 2017-03-02 RX ORDER — CHLORHEXIDINE GLUCONATE ORAL RINSE 1.2 MG/ML
15 SOLUTION DENTAL 2 TIMES DAILY
Qty: 420 ML | Refills: 0 | Status: SHIPPED | OUTPATIENT
Start: 2017-03-02 | End: 2017-03-16

## 2017-03-02 RX ORDER — HYDROCODONE BITARTRATE AND ACETAMINOPHEN 7.5; 325 MG/15ML; MG/15ML
15 SOLUTION ORAL EVERY 6 HOURS PRN
Qty: 473 ML | Refills: 0 | Status: SHIPPED | OUTPATIENT
Start: 2017-03-02

## 2017-03-02 RX ORDER — CHLORHEXIDINE GLUCONATE ORAL RINSE 1.2 MG/ML
15 SOLUTION DENTAL 2 TIMES DAILY
Qty: 420 ML | Refills: 0 | Status: SHIPPED | OUTPATIENT
Start: 2017-03-02 | End: 2017-03-02

## 2017-03-02 RX ORDER — ONDANSETRON 4 MG/1
4 TABLET, ORALLY DISINTEGRATING ORAL EVERY 6 HOURS PRN
Qty: 5 TABLET | Refills: 5 | Status: SHIPPED | OUTPATIENT
Start: 2017-03-02

## 2017-03-02 RX ORDER — ONDANSETRON 4 MG/1
4 TABLET, ORALLY DISINTEGRATING ORAL EVERY 6 HOURS PRN
Qty: 5 TABLET | Refills: 5 | Status: SHIPPED | OUTPATIENT
Start: 2017-03-02 | End: 2017-03-02

## 2017-03-02 RX ORDER — HYDROCODONE BITARTRATE AND ACETAMINOPHEN 7.5; 325 MG/15ML; MG/15ML
15 SOLUTION ORAL EVERY 6 HOURS PRN
Qty: 473 ML | Refills: 0 | Status: SHIPPED | OUTPATIENT
Start: 2017-03-02 | End: 2017-03-02

## 2017-03-02 RX ORDER — POLYETHYLENE GLYCOL 3350 17 G/17G
17 POWDER, FOR SOLUTION ORAL DAILY
Status: DISCONTINUED | OUTPATIENT
Start: 2017-03-02 | End: 2017-03-02 | Stop reason: HOSPADM

## 2017-03-02 RX ORDER — CHLORHEXIDINE GLUCONATE ORAL RINSE 1.2 MG/ML
15 SOLUTION DENTAL 2 TIMES DAILY
Status: DISCONTINUED | OUTPATIENT
Start: 2017-03-02 | End: 2017-03-02 | Stop reason: HOSPADM

## 2017-03-02 RX ADMIN — MORPHINE SULFATE 2 MG: 2 INJECTION, SOLUTION INTRAMUSCULAR; INTRAVENOUS at 12:03

## 2017-03-02 RX ADMIN — CLINDAMYCIN IN 5 PERCENT DEXTROSE 900 MG: 18 INJECTION, SOLUTION INTRAVENOUS at 06:03

## 2017-03-02 NOTE — OP NOTE
DATE OF PROCEDURE:  03/01/2017    PREOPERATIVE DIAGNOSES:  1.  Left oral tongue leukoplakia.  2.  Left buccal mucosa leukoplakia.    POSTOPERATIVE DIAGNOSES:  1.  Left oral tongue leukoplakia.  2.  Left buccal mucosa leukoplakia.    PROCEDURES:  1.  Left partial glossectomy with primary closure.  2.  Resection, left buccal mucosa leukoplakia with preservation of the   buccinator muscle.    SURGEON:  Jose C Varela M.D.    ASSISTANT:  Reynold Jackman M.D. (RES)    ANESTHESIA:  General endotracheal.    ESTIMATED BLOOD LOSS:  Minimal.    SPECIMENS:  1.  Left partial glossectomy, single stitch anterior, double stitch superior.  2.  Left buccal mucosa, single stitch anterior, double stitch superior.  Both   were sent for permanent analysis.    INDICATIONS FOR PROCEDURE:  Mr. 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  He has undergone laser   ablation of these lesions at the [facility] in [place].  He   recently presented to me with concern that the leukoplakia of the left oral   tongue was enlarging and becoming painful.  He reported similar complaints   related to the buccal mucosa.  These had been previously biopsied as mild   dysplasia.  In light of these findings, he indicated an interest in undergoing   resection in the Operating Room.  In spite of the risks inherent to surgery, he   provided informed consent.    PROCEDURE IN DETAIL:  The patient was taken to the Operating Room, placed on the   operating table in supine position.  General endotracheal anesthesia was   induced by the Anesthesia Team.  His airway was secured by the Otolaryngology   Service after exposing the larynx utilizing the Serafin anterior commissure   laryngoscope.  With the airway secured, the self-retaining cheek retractor was   placed and the left tongue was addressed.  There was noted to be faint   leukoplakia of the anterolateral tongue on the left side.   Several-millimeter   margin was taken surrounding the lesion with sharp dissection proceeding down   through the underlying submucosa to the musculature of the tongue.  This   specimen was completely amputated utilizing the Bovie and oriented as above.  It   was sent to Pathology for permanent analysis.  The wound was closed with   interrupted 3-0 Vicryl suture.    Next, our attention was turned to the left buccal mucosa where an identical   procedure was performed.  Care was taken to avoid injury to Stensen duct.  Once   the specimen had been completely amputated, it was sent to Pathology for   permanent analysis.  It was oriented as above.  Care was taken during this   dissection to avoid injury to the underlying buccinator muscle.    The wound was then closed with interrupted 3-0 Vicryl suture and the patient was   handed back to Anesthesia, awakened, extubated and transported to the Recovery   in satisfactory condition.  There were no intraoperative complications.  I was   present and participated in the entire procedure.      CPH/NATE  dd: 03/01/2017 16:35:00 (CST)  td: 03/01/2017 19:37:56 (CST)  Doc ID   #[id number]  Job ID #[id number]    CC:

## 2017-03-02 NOTE — DISCHARGE SUMMARY
[facility]  Short Stay  Discharge Summary    Admit Date: 3/1/2017    Discharge Date and Time:  03/02/2017 6:33 AM      Discharge Attending Physician: Jose C Varela MD     Hospital Course (synopsis of major diagnoses, care, treatment, and services provided during the course of the hospital stay): Patient was admitted post op after partial glossectomy and buccal mucosa biopsy. He did well in the postoperative course without any untoward events. He was tolerating PO intake and his pain was adequately controlled with analgesics. He was doing well on POD#1 and cleared for discharge medically.    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       Discharged Condition: good    Disposition: Home or Self Care    Follow up/Patient Instructions:    Medications:  Reconciled Home Medications:   Current Discharge Medication List      START taking these medications    Details   chlorhexidine (PERIDEX) 0.12 % solution Use as directed 15 mLs in the mouth or throat 2 (two) times daily.  Qty: 420 mL, Refills: 0      hydrocodone-acetaminophen (HYCET) solution 7.5-325 mg/15mL Take 15 mLs by mouth every 6 (six) hours as needed for Pain.  Qty: 473 mL, Refills: 0      ondansetron (ZOFRAN-ODT) 4 MG TbDL Take 1 tablet (4 mg total) by mouth every 6 (six) hours as needed (nausea).  Qty: 5 tablet, Refills: 5         CONTINUE these medications which have NOT CHANGED    Details   atorvastatin (LIPITOR) 20 MG tablet Take 20 mg by mouth once daily.      celecoxib (CELEBREX) 200 MG capsule Take 200 mg by mouth 2 (two) times daily.      GLUC HCL/MSM/C/MN/WILLOW/GING (GLUCOS-MSM-C-XU-DIWTOB-LKSPIH ORAL) Take by mouth.             Discharge Procedure Orders  Diet general     Activity  as tolerated     Call MD for:  temperature >100.4     Call MD for:  persistent nausea and vomiting or diarrhea     Call MD for:  severe uncontrolled pain     Call MD for:  redness, tenderness, or signs of infection (pain, swelling, redness, odor or green/yellow discharge around incision site)     Call MD for:  difficulty breathing or increased cough       Follow-up Information     Follow up with Shana Orozco NP In 2 weeks.    Specialty:  Otolaryngology    Why:  post op    Contact information:    [facility]  [facility] [id number] [phone]

## 2017-03-02 NOTE — NURSING
Discharge instructions reviewed with patient and family; both verbalize understanding. Prescriptions and paperwork given to patient. Medications reconciled; IV DC'ed, patient tolerated well. Vital signs stable; no signs of distress. Activating transport. Will continue to monitor.

## 2017-03-02 NOTE — PROGRESS NOTES
Otolaryngology - Head and Neck Surgery  Progress Note    Subjective: NAEON. Patient c/o constipation this morning. Pain adequately controlled.    Objective:  Temp:  [96.2 °F (35.7 °C)-98.2 °F (36.8 °C)]   Pulse:  [56-76]   Resp:  [10-20]   BP: (115-142)/(71-89)   SpO2:  [93 %-100 %]          Physical Exam   General: NAD  Neuro:  AAOx3  Eyes: EOMI  OC/OP: intraoral incisions c/d/i, no bleeding  Resp: nonlabored breathing    Drains  none      CBC  No results for input(s): WBC, HGB, HCT, MCV, PLT in the last 72 hours.    BMP  No results for input(s): GLU, NA, K, CL, CO2, BUN, CREATININE, CALCIUM, PHOS, MG, PREALBUMIN in the last 72 hours.    COAGS  No results for input(s): PROTIME, INR, PTT in the last 72 hours.      Assessment: Patient is a 61 y.o. male w/ oral tongue dysplasia s/p left partial glossectomy and and left buccal mucosa biopsy POD#1. Doing well with no complaints. Surgical sites appear as expected.      Plan:  - Bowel regimen this morning  - Peridex rinses  - Discharge home today  - Discussed plan w/ Dr. Nichole Michele, DO   Otolaryngology PGY1  Pager: [phone]  3/2/2017 6:24 AM

## 2017-03-13 ENCOUNTER — OFFICE VISIT (OUTPATIENT)
Dept: OTOLARYNGOLOGY | Facility: CLINIC | Age: 62
End: 2017-03-13
Payer: COMMERCIAL

## 2017-03-13 VITALS
DIASTOLIC BLOOD PRESSURE: 82 MMHG | BODY MASS INDEX: 27.98 KG/M2 | SYSTOLIC BLOOD PRESSURE: 138 MMHG | WEIGHT: 200.63 LBS | TEMPERATURE: 97 F | HEART RATE: 56 BPM

## 2017-03-13 DIAGNOSIS — K13.79 ACQUIRED DYSPLASIA OF ORAL CAVITY: Primary | ICD-10-CM

## 2017-03-13 PROCEDURE — 99024 POSTOP FOLLOW-UP VISIT: CPT | Mod: S$GLB,,, | Performed by: NURSE PRACTITIONER

## 2017-03-13 PROCEDURE — 99999 PR PBB SHADOW E&M-EST. PATIENT-LVL III: CPT | Mod: PBBFAC,,, | Performed by: NURSE PRACTITIONER

## 2017-03-13 RX ORDER — ATORVASTATIN CALCIUM 10 MG/1
TABLET, FILM COATED ORAL
Refills: 0 | COMMUNITY
Start: 2017-02-08

## 2017-03-13 NOTE — PROGRESS NOTES
Chief Complaint   Patient presents with    Post-op Evaluation       HPI   61 y.o. male presents for a post op visit. He has been doing well since his most recent surgery. He has minimal pain. He has no complaints today.    He was referred to Dr. Varela for evaluation of dysplasia of the tongue.  He states that he was diagnosed with mild dysplasia of the oral tongue in 2014.  He was referred to [facility] in [place], [place] where he underwent multiple CO2 laser ablations of these oral lesions.  He presents today in follow up.  He reports some discomfort of the L tongue and buccal mucosa lesions and wishes to undergo resection.    Review of Systems   Constitutional: Negative for fatigue and unexpected weight change.   HENT: Per HPI.  Eyes: Negative for visual disturbance.   Respiratory: Negative for shortness of breath, hemoptysis   Cardiovascular: Negative for chest pain and palpitations.   Musculoskeletal: Negative for decreased ROM, back pain.   Skin: Negative for rash, sunburn, itching.   Neurological: Negative for dizziness and seizures.   Hematological: Negative for adenopathy. Does not bruise/bleed easily.   Endocrine: Negative for rapid weight loss/weight gain, heat/cold intolerance.     Past Medical History   Past Medical History:   Diagnosis Date    Arthritis     Cancer     HLD (hyperlipidemia)          Past Surgical History   History reviewed. No pertinent surgical history.      Family History   History reviewed. No pertinent family history.        Social History   .  Social History     Social History    Marital status:      Spouse name: N/A    Number of children: N/A    Years of education: N/A     Occupational History    Not on file.     Social History Main Topics    Smoking status: Never Smoker    Smokeless tobacco: Not on file    Alcohol use Yes    Drug use: No    Sexual activity: Not on file     Other Topics Concern    Not on file     Social History Narrative         Allergies   Review of  patient's allergies indicates:   Allergen Reactions    Penicillins Other (See Comments)     From early childhood           Physical Exam     Vitals:    03/13/17 1116   BP: 138/82   Pulse: (!) 56   Temp: 97 °F (36.1 °C)         Body mass index is 27.98 kg/(m^2).      General: AOx3, NAD   Nose: No gross nasal septal deviation. Inferior Turbinates WNL bilaterally. No septal perforation. No masses/lesions.   Oral Cavity:  Oral Tongue mobile.  Surgical sites healing well.  Hard Palate WNL.  Oropharynx:  No masses/lesions of the posterior pharyngeal wall. Tonsillar fossa without lesions. Soft palate without masses. Midline uvula.   Neck: No scars.  No cervical lymphadenopathy, thyromegaly or thyroid nodules.    Face: House Brackmann I bilaterally.     FINAL PATHOLOGIC DIAGNOSIS  #1 LEFT PARTIAL GLOSSECTOMY:  BENIGN FIBROEPITHELIAL POLYP WITH MILD HYPERKERATOSIS.  NO DYSPLASIA OR MALIGNANCY IDENTIFIED.  #2 LEFT BUCCAL MUCOSA:  BENIGN SQUAMOUS MUCOSA WITH MARKED HYPERKERATOSIS.  NO DYSPLASIA OR MALIGNANCY IDENTIFIED.    Assessment   1. Acquired dysplasia of oral cavity        Plan   61 y.o. male nearly 2 weeks s/p resection of the L tongue and buccal mucosa lesions. He is healing well. We discussed his benign path report. He will RTC in 4 weeks, sooner if needed.

## 2017-04-06 ENCOUNTER — OFFICE VISIT (OUTPATIENT)
Dept: OTOLARYNGOLOGY | Facility: CLINIC | Age: 62
End: 2017-04-06
Payer: COMMERCIAL

## 2017-04-06 VITALS
BODY MASS INDEX: 27.64 KG/M2 | DIASTOLIC BLOOD PRESSURE: 82 MMHG | HEART RATE: 58 BPM | WEIGHT: 198.19 LBS | SYSTOLIC BLOOD PRESSURE: 132 MMHG | TEMPERATURE: 96 F

## 2017-04-06 DIAGNOSIS — K13.79 ACQUIRED DYSPLASIA OF ORAL CAVITY: Primary | ICD-10-CM

## 2017-04-06 PROCEDURE — 99999 PR PBB SHADOW E&M-EST. PATIENT-LVL III: CPT | Mod: PBBFAC,,, | Performed by: NURSE PRACTITIONER

## 2017-04-06 PROCEDURE — 99024 POSTOP FOLLOW-UP VISIT: CPT | Mod: S$GLB,,, | Performed by: NURSE PRACTITIONER

## 2017-04-06 NOTE — PATIENT INSTRUCTIONS
Keep us updated while you are out of town if you have any concerns. You can send images via [facility].

## 2017-04-06 NOTE — MR AVS SNAPSHOT
[facility] - Head/Neck Surg [facility]  [address]  [facility] [address]  Phone: [phone]  Fax: [phone]                  Alfredo Treviño   2017 9:45 AM   Office Visit    Description:  Male : 1955   Provider:  Shana Orozco NP   Department:  [facility] - Head/Neck Surg On[facility]           Reason for Visit     Follow-up           Diagnoses this Visit        Comments    Acquired dysplasia of oral cavity    -  Primary            To Do List           Goals (5 Years of Data)     None      [facility]     [facility] Nurse Care Line -  Assistance  Unless otherwise directed by your provider, please contact [facility], our nurse care line that is available for  assistance.     Registered nurses in the [facility] provide: appointment scheduling, clinical advisement, health education, and other advisory services.  Call: [phone] (toll free)               Medications           Message regarding Medications     Verify the changes and/or additions to your medication regime listed below are the same as discussed with your clinician today.  If any of these changes or additions are incorrect, please notify your healthcare provider.             Verify that the below list of medications is an accurate representation of the medications you are currently taking.  If none reported, the list may be blank. If incorrect, please contact your healthcare provider. Carry this list with you in case of emergency.           Current Medications     atorvastatin (LIPITOR) 10 MG tablet     atorvastatin (LIPITOR) 20 MG tablet Take 20 mg by mouth once daily.    celecoxib (CELEBREX) 200 MG capsule Take 200 mg by mouth 2 (two) times daily.    GLUC HCL/MSM/C/MN/WILLOW/GING (GLUCOS-MSM-C-RY-XUXBGG-JDZQWF ORAL) Take by mouth.    hydrocodone-acetaminophen (HYCET) solution 7.5-325 mg/15mL Take 15 mLs by mouth every 6 (six) hours as needed for Pain.    ondansetron (ZOFRAN-ODT) 4 MG TbDL Take 1 tablet  (4 mg total) by mouth every 6 (six) hours as needed (nausea).           Clinical Reference Information           Your Vitals Were     BP Pulse Temp Weight BMI    132/82 58 95.6 °F (35.3 °C) 89.9 kg (198 lb 3.1 oz) 27.64 kg/m2      Blood Pressure          Most Recent Value    BP  132/82      Allergies as of 4/6/2017     Penicillins      Immunizations Administered on Date of Encounter - 4/6/2017     None      [facility] Sign-Up     Activating your [facility] account is as easy as 1-2-3!     1) Visit [employer] [email or web address], select Sign Up Now, enter this activation code and your date of birth, then select Next.  [email or web address]  Expires: 4/16/2017  8:14 AM      2) Create a username and password to use when you visit [facility] in the future and select a security question in case you lose your password and select Next.    3) Enter your e-mail address and click Sign Up!    Additional Information  If you have questions, please e-mail [email or web address] or call [phone] to talk to our [facility] staff. Remember, [facility] is NOT to be used for urgent needs. For medical emergencies, dial 911.         Instructions    Keep us updated while you are out of town if you have any concerns. You can send images via [facility].       Language Assistance Services     ATTENTION: Language assistance services are available, free of charge. Please call [phone].      ATENCIÓN: Si habla español, tiene a carvalho disposición servicios gratuitos de asistencia lingüística. Llame al [phone].     CHÚ Ý: N?u b?n nói Ti?ng Vi?t, có các d?ch v? h? tr? ngôn ng? mi?n phí dành cho b?n. G?i s? [phone].         [facility] complies with applicable Federal civil rights laws and does not discriminate on the basis of race, color, national origin, age, disability, or sex.

## 2017-04-06 NOTE — PROGRESS NOTES
"Chief Complaint   Patient presents with    Follow-up       HPI   61 y.o. male presents for a post op visit. He has been doing well since his last visit. He has minimal pain. His only complaint is a small white "knot" that has developed below his tongue 3 weeks ago. It is not changing in size. It is mildly tender.    He was referred to Dr. Varela for evaluation of dysplasia of the tongue.  He states that he was diagnosed with mild dysplasia of the oral tongue in 2014.  He was referred to [facility] in [place], [place] where he underwent multiple CO2 laser ablations of these oral lesions.  He presents today in follow up.  He reports some discomfort of the L tongue and buccal mucosa lesions and wishes to undergo resection.    Review of Systems   Constitutional: Negative for fatigue and unexpected weight change.   HENT: Per HPI.  Eyes: Negative for visual disturbance.   Respiratory: Negative for shortness of breath, hemoptysis   Cardiovascular: Negative for chest pain and palpitations.   Musculoskeletal: Negative for decreased ROM, back pain.   Skin: Negative for rash, sunburn, itching.   Neurological: Negative for dizziness and seizures.   Hematological: Negative for adenopathy. Does not bruise/bleed easily.   Endocrine: Negative for rapid weight loss/weight gain, heat/cold intolerance.     Past Medical History   Past Medical History:   Diagnosis Date    Arthritis     Cancer     HLD (hyperlipidemia)          Past Surgical History   History reviewed. No pertinent surgical history.      Family History   History reviewed. No pertinent family history.        Social History   .  Social History     Social History    Marital status:      Spouse name: N/A    Number of children: N/A    Years of education: N/A     Occupational History    Not on file.     Social History Main Topics    Smoking status: Never Smoker    Smokeless tobacco: Not on file    Alcohol use Yes    Drug use: No    Sexual activity: Not on file     Other " Topics Concern    Not on file     Social History Narrative         Allergies   Review of patient's allergies indicates:   Allergen Reactions    Penicillins Other (See Comments)     From early childhood           Physical Exam     Vitals:    04/06/17 1006   BP: 132/82   Pulse: (!) 58   Temp: (!) 95.6 °F (35.3 °C)         Body mass index is 27.64 kg/(m^2).      General: AOx3, NAD   Nose: No gross nasal septal deviation. Inferior Turbinates WNL bilaterally. No septal perforation. No masses/lesions.   Oral Cavity:  Oral Tongue mobile.  Surgical sites well heled.  2mm pearly papule below tongue. Unroofed with small amount of white debris expressed. Pt tolerated well. Hard Palate WNL.  Oropharynx:  No masses/lesions of the posterior pharyngeal wall. Tonsillar fossa without lesions. Soft palate without masses. Midline uvula.   Neck: No scars.  No cervical lymphadenopathy, thyromegaly or thyroid nodules.    Face: House Brackmann I bilaterally.     FINAL PATHOLOGIC DIAGNOSIS  #1 LEFT PARTIAL GLOSSECTOMY:  BENIGN FIBROEPITHELIAL POLYP WITH MILD HYPERKERATOSIS.  NO DYSPLASIA OR MALIGNANCY IDENTIFIED.  #2 LEFT BUCCAL MUCOSA:  BENIGN SQUAMOUS MUCOSA WITH MARKED HYPERKERATOSIS.  NO DYSPLASIA OR MALIGNANCY IDENTIFIED.    Assessment   1. Acquired dysplasia of oral cavity        Plan   61 y.o. male nearly 6 weeks s/p resection of the L tongue and buccal mucosa lesions. He is healing well. The area of concern under his tongue is likely a retained suture. He will keep an eye on this [place] and will send us messages via [facility] prn. He will RTC in 2-3 months, sooner if needed.

## 2017-06-15 ENCOUNTER — OFFICE VISIT (OUTPATIENT)
Dept: OTOLARYNGOLOGY | Facility: CLINIC | Age: 62
End: 2017-06-15
Payer: COMMERCIAL

## 2017-06-15 VITALS
WEIGHT: 200.38 LBS | HEART RATE: 61 BPM | SYSTOLIC BLOOD PRESSURE: 128 MMHG | DIASTOLIC BLOOD PRESSURE: 86 MMHG | BODY MASS INDEX: 27.95 KG/M2

## 2017-06-15 DIAGNOSIS — K13.79 ACQUIRED DYSPLASIA OF ORAL CAVITY: Primary | ICD-10-CM

## 2017-06-15 PROCEDURE — 99213 OFFICE O/P EST LOW 20 MIN: CPT | Mod: S$GLB,,, | Performed by: OTOLARYNGOLOGY

## 2017-06-15 PROCEDURE — 99999 PR PBB SHADOW E&M-EST. PATIENT-LVL III: CPT | Mod: PBBFAC,,, | Performed by: OTOLARYNGOLOGY

## 2017-06-15 NOTE — PROGRESS NOTES
Chief Complaint   Patient presents with    Follow-up       HPI   62 y.o. male presents for a post op visit. Doing well since surgery he states that his L tongue remains somewhat numb and that he is concerned about a new white area that he noted at the surgical site.    He was referred to Dr. Varela for evaluation of dysplasia of the tongue.  He states that he was diagnosed with mild dysplasia of the oral tongue in 2014.  He was referred to [facility] in [place], [place] where he underwent multiple CO2 laser ablations of these oral lesions.      Review of Systems   Constitutional: Negative for fatigue and unexpected weight change.   HENT: Per HPI.  Eyes: Negative for visual disturbance.   Respiratory: Negative for shortness of breath, hemoptysis   Cardiovascular: Negative for chest pain and palpitations.   Musculoskeletal: Negative for decreased ROM, back pain.   Skin: Negative for rash, sunburn, itching.   Neurological: Negative for dizziness and seizures.   Hematological: Negative for adenopathy. Does not bruise/bleed easily.   Endocrine: Negative for rapid weight loss/weight gain, heat/cold intolerance.     Past Medical History   Past Medical History:   Diagnosis Date    Arthritis     Cancer     HLD (hyperlipidemia)          Past Surgical History   No past surgical history on file.      Family History   No family history on file.        Social History   .  Social History     Social History    Marital status:      Spouse name: N/A    Number of children: N/A    Years of education: N/A     Occupational History    Not on file.     Social History Main Topics    Smoking status: Never Smoker    Smokeless tobacco: Not on file    Alcohol use Yes    Drug use: No    Sexual activity: Not on file     Other Topics Concern    Not on file     Social History Narrative    No narrative on file         Allergies   Review of patient's allergies indicates:   Allergen Reactions    Penicillins Other (See Comments)     From  early childhood           Physical Exam     Vitals:    06/15/17 0938   BP: 128/86   Pulse: 61         Body mass index is 27.95 kg/m².      General: AOx3, NAD   Nose: No gross nasal septal deviation. Inferior Turbinates WNL bilaterally. No septal perforation. No masses/lesions.   Oral Cavity:  Oral Tongue mobile.  Surgical sites well healed.The white area in question is mature scar.  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  Oropharynx:  No masses/lesions of the posterior pharyngeal wall. Tonsillar fossa without lesions. Soft palate without masses. Midline uvula.   Neck: No scars.  No cervical lymphadenopathy, thyromegaly or thyroid nodules.    Face: House Brackmann I bilaterally.     Assessment   1. Acquired dysplasia of oral cavity        Plan   62 y.o. male  s/p resection of the L tongue and buccal mucosa lesions. Doing well.  Remaining lesions stable.  I asked him to return in 3 mos but her requested an appt in 6-12 mos.  This is a reasonable time course given the chronicity of this problem and the mild dysplasia present.  He will monitor for worsening of these lesions.  He will call if the lesions enlarge, become painful, bleed or for any other concerns.  RTC 12 mos.

## 2019-06-18 ENCOUNTER — OFFICE VISIT (OUTPATIENT)
Dept: OTOLARYNGOLOGY | Facility: CLINIC | Age: 64
End: 2019-06-18
Payer: COMMERCIAL

## 2019-06-18 VITALS
HEART RATE: 63 BPM | BODY MASS INDEX: 29.43 KG/M2 | WEIGHT: 211 LBS | SYSTOLIC BLOOD PRESSURE: 141 MMHG | DIASTOLIC BLOOD PRESSURE: 85 MMHG

## 2019-06-18 DIAGNOSIS — K13.79 ACQUIRED DYSPLASIA OF ORAL CAVITY: Primary | ICD-10-CM

## 2019-06-18 PROCEDURE — 99213 PR OFFICE/OUTPT VISIT, EST, LEVL III, 20-29 MIN: ICD-10-PCS | Mod: S$GLB,,, | Performed by: OTOLARYNGOLOGY

## 2019-06-18 PROCEDURE — 3008F BODY MASS INDEX DOCD: CPT | Mod: CPTII,S$GLB,, | Performed by: OTOLARYNGOLOGY

## 2019-06-18 PROCEDURE — 99999 PR PBB SHADOW E&M-EST. PATIENT-LVL III: ICD-10-PCS | Mod: PBBFAC,,, | Performed by: OTOLARYNGOLOGY

## 2019-06-18 PROCEDURE — 3008F PR BODY MASS INDEX (BMI) DOCUMENTED: ICD-10-PCS | Mod: CPTII,S$GLB,, | Performed by: OTOLARYNGOLOGY

## 2019-06-18 PROCEDURE — 99213 OFFICE O/P EST LOW 20 MIN: CPT | Mod: S$GLB,,, | Performed by: OTOLARYNGOLOGY

## 2019-06-18 PROCEDURE — 99999 PR PBB SHADOW E&M-EST. PATIENT-LVL III: CPT | Mod: PBBFAC,,, | Performed by: OTOLARYNGOLOGY

## 2019-06-18 RX ORDER — METFORMIN HYDROCHLORIDE 500 MG/1
500 TABLET ORAL 2 TIMES DAILY
Refills: 3 | COMMUNITY
Start: 2019-06-13

## 2019-06-23 NOTE — PROGRESS NOTES
Chief Complaint   Patient presents with    Follow-up       HPI   64 y.o. 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  No new complaints.  He has undergone numerous procedures for this problem over the years.  Path in the past has revealed mild dysplasia.  Most recently, he underwent resection of several leukoplakic lesions by me in March of 2017.  Path was benign.    Review of Systems   Constitutional: Negative for fatigue and unexpected weight change.   HENT: Per HPI.  Eyes: Negative for visual disturbance.   Respiratory: Negative for shortness of breath, hemoptysis   Cardiovascular: Negative for chest pain and palpitations.   Musculoskeletal: Negative for decreased ROM, back pain.   Skin: Negative for rash, sunburn, itching.   Neurological: Negative for dizziness and seizures.   Hematological: Negative for adenopathy. Does not bruise/bleed easily.   Endocrine: Negative for rapid weight loss/weight gain, heat/cold intolerance.     Past Medical History   Past Medical History:   Diagnosis Date    Arthritis     Cancer     HLD (hyperlipidemia)          Past Surgical History   Past Surgical History:   Procedure Laterality Date    RESECTION-TUMOR-ORAL Left 3/1/2017    Performed by Jose C Varela MD at [facility] [address] [place] [address]         Family History   History reviewed. No pertinent family history.        Social History   .  Social History     Socioeconomic History    Marital status:      Spouse name: Not on file    Number of children: Not on file    Years of education: Not on file    Highest education level: Not on file   Occupational History    Not on file   Social Needs    Financial resource strain: Not on file    Food insecurity:     Worry: Not on file     Inability: Not on file    Transportation needs:     Medical: Not on file     Non-medical: Not on file   Tobacco Use    Smoking status: Never Smoker   Substance and Sexual Activity    Alcohol use: Yes    Drug use: No    Sexual activity: Not  on file   Lifestyle    Physical activity:     Days per week: Not on file     Minutes per session: Not on file    Stress: Not on file   Relationships    Social connections:     Talks on phone: Not on file     Gets together: Not on file     Attends Scientologist service: Not on file     Active member of club or organization: Not on file     Attends meetings of clubs or organizations: Not on file     Relationship status: Not on file   Other Topics Concern    Not on file   Social History Narrative    Not on file         Allergies   Review of patient's allergies indicates:   Allergen Reactions    Penicillins Other (See Comments)     From early childhood           Physical Exam     Vitals:    06/18/19 1334   BP: (!) 141/85   Pulse: 63         Body mass index is 29.43 kg/m².      General: AOx3, NAD   Nose: No gross nasal septal deviation. Inferior Turbinates WNL bilaterally. No septal perforation. No masses/lesions.   Oral Cavity:  Oral Tongue mobile.  Surgical sites well healed.The white area in question is mature scar.  There is leukoplakia of the L buccal mucosa and R lateral oral tongue. Though it has been several years since I have seen him, these lesions appear to be stable in character and incised.  Oropharynx:  No masses/lesions of the posterior pharyngeal wall. Tonsillar fossa without lesions. Soft palate without masses. Midline uvula.   Neck: No scars.  No cervical lymphadenopathy, thyromegaly or thyroid nodules.    Face: House Brackmann I bilaterally.             Assessment   1. Acquired dysplasia of oral cavity        Plan   64 y.o. 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  His exam, to the best of my recollection, is stable in comparison to his last visit several years ago.  He has no new complaints.  I am inclined to watch him closely.  He is to follow up with me in 4 months.  I will re-evaluate at that time.  Should any changes be apparent, I will perform biopsy.  He  will contact me if changes or problems occur prior to that time.

## 2021-04-08 ENCOUNTER — OFFICE VISIT (OUTPATIENT)
Dept: OTOLARYNGOLOGY | Facility: CLINIC | Age: 66
End: 2021-04-08
Payer: COMMERCIAL

## 2021-04-08 VITALS
SYSTOLIC BLOOD PRESSURE: 135 MMHG | DIASTOLIC BLOOD PRESSURE: 88 MMHG | BODY MASS INDEX: 28.81 KG/M2 | WEIGHT: 206.56 LBS | HEART RATE: 61 BPM

## 2021-04-08 DIAGNOSIS — K13.79 ACQUIRED DYSPLASIA OF ORAL CAVITY: Primary | ICD-10-CM

## 2021-04-08 PROCEDURE — 99999 PR PBB SHADOW E&M-EST. PATIENT-LVL III: CPT | Mod: PBBFAC,,, | Performed by: OTOLARYNGOLOGY

## 2021-04-08 PROCEDURE — 99213 OFFICE O/P EST LOW 20 MIN: CPT | Mod: S$GLB,,, | Performed by: OTOLARYNGOLOGY

## 2021-04-08 PROCEDURE — 1126F AMNT PAIN NOTED NONE PRSNT: CPT | Mod: S$GLB,,, | Performed by: OTOLARYNGOLOGY

## 2021-04-08 PROCEDURE — 3008F PR BODY MASS INDEX (BMI) DOCUMENTED: ICD-10-PCS | Mod: CPTII,S$GLB,, | Performed by: OTOLARYNGOLOGY

## 2021-04-08 PROCEDURE — 3008F BODY MASS INDEX DOCD: CPT | Mod: CPTII,S$GLB,, | Performed by: OTOLARYNGOLOGY

## 2021-04-08 PROCEDURE — 1126F PR PAIN SEVERITY QUANTIFIED, NO PAIN PRESENT: ICD-10-PCS | Mod: S$GLB,,, | Performed by: OTOLARYNGOLOGY

## 2021-04-08 PROCEDURE — 99213 PR OFFICE/OUTPT VISIT, EST, LEVL III, 20-29 MIN: ICD-10-PCS | Mod: S$GLB,,, | Performed by: OTOLARYNGOLOGY

## 2021-04-08 PROCEDURE — 99999 PR PBB SHADOW E&M-EST. PATIENT-LVL III: ICD-10-PCS | Mod: PBBFAC,,, | Performed by: OTOLARYNGOLOGY

## (undated) DEVICE — SUT LIGACLIP SMALL XTRA

## (undated) DEVICE — SUCTION COAGULATOR 10FR 6IN

## (undated) DEVICE — ELECTRODE BLADE INSULATED 1 IN

## (undated) DEVICE — NDL HYPO REG 25G X 1 1/2

## (undated) DEVICE — TRAY MINOR GEN SURG

## (undated) DEVICE — ELECTRODE REM PLYHSV RETURN 9

## (undated) DEVICE — DRAPE STERI INSTRUMENT 1018

## (undated) DEVICE — SHEET EENT SPLIT

## (undated) DEVICE — SKINMARKER & RULER REGULAR X-F

## (undated) DEVICE — SEE MEDLINE ITEM 157128

## (undated) DEVICE — CORD BIPOLAR 12 FOOT

## (undated) DEVICE — SEE MEDLINE ITEM 152622

## (undated) DEVICE — CLIP MED TICALL

## (undated) DEVICE — GOWN SURGICAL X-LARGE